# Patient Record
Sex: FEMALE | Race: WHITE | NOT HISPANIC OR LATINO | Employment: UNEMPLOYED | URBAN - METROPOLITAN AREA
[De-identification: names, ages, dates, MRNs, and addresses within clinical notes are randomized per-mention and may not be internally consistent; named-entity substitution may affect disease eponyms.]

---

## 2024-05-14 ENCOUNTER — OFFICE VISIT (OUTPATIENT)
Dept: OBSTETRICS AND GYNECOLOGY | Facility: CLINIC | Age: 54
End: 2024-05-14
Payer: COMMERCIAL

## 2024-05-14 VITALS
BODY MASS INDEX: 23.98 KG/M2 | HEIGHT: 61 IN | DIASTOLIC BLOOD PRESSURE: 64 MMHG | SYSTOLIC BLOOD PRESSURE: 114 MMHG | WEIGHT: 127 LBS

## 2024-05-14 DIAGNOSIS — Z12.31 ENCOUNTER FOR SCREENING MAMMOGRAM FOR BREAST CANCER: ICD-10-CM

## 2024-05-14 DIAGNOSIS — R92.343 EXTREMELY DENSE TISSUE OF BOTH BREASTS ON MAMMOGRAPHY: ICD-10-CM

## 2024-05-14 DIAGNOSIS — Z01.419 ENCOUNTER FOR WELL WOMAN EXAM WITH ROUTINE GYNECOLOGICAL EXAM: Primary | ICD-10-CM

## 2024-05-14 PROCEDURE — 99396 PREV VISIT EST AGE 40-64: CPT | Performed by: OBSTETRICS & GYNECOLOGY

## 2024-05-14 RX ORDER — ACETAMINOPHEN 500 MG
5000 TABLET ORAL
COMMUNITY

## 2024-05-14 NOTE — PROGRESS NOTES
PAP 23 NEG HPV NEG  MAMMO 23  DEXA NEVER  COLON NEVER/ FECAL CARD 21 NEG    ASSESSMENT/PLAN    1. Encounter for well woman exam with routine gynecological exam  Normal well woman exam.   No pap done.   Last pap  was normal and HPV negative.     2. Encounter for screening mammogram for breast cancer  A screening mammogram requisition has been placed.   Please schedule your mammogram     3. Extremely dense tissue of both breasts on mammography  Had referral at [facility] high risk breast clinic  MR breast bilateral w IV contrast fast screening self pay;  - fast breast MRI  schedule for 2024.     SUBJECTIVE    HPI    54 yo  for routine well woman visit.   Last exam 2023 for RA  Last year was referred to [facility] risk breast clinic due to dense breasts on imaging. Recommended to have MRI and planned to have at [facility] 2024. Mammograms done at . Due for screening mamm now. Offered option of doing fast breast MRI at  in November so her mamm results can be compared. Pt agreeable.  Denies any breast complaints.   Stopped thyroid med in past and has bloodwork every 6 months to follow.  Menopausal age 51. Denies any bleeding.  No other intervening medical or surgical issues.  Oldest son graduated from [employer], working in [place]. Other son finishing freshman year at [employer].  , no smoke, occ ETOH.    REVIEW OF SYSTEMS    Constitutional: no fever, no chills, + weight gain, no recent weight loss, no fatigue.   Eyes: no eye pain, no vision problems, no dryness of the eyes.   ENT: no hearing loss, no nosebleeds, no sinus congestion.   Cardiovascular: no chest pain, no palpitations.  Respiratory: no shortness of breath, no cough, no wheezing.   Gastrointestinal: no abdominal pain, no constipation, no nausea, no diarrhea, no vomiting.   Genitourinary: no pelvic pain, no dysuria, + occ incontinence, no change in urinary frequency, no vaginal dryness, no vaginal itching,  no vaginal discharge, no  "unexplained vaginal bleeding, no lesion/sore.   Musculoskeletal: no back pain, no joint swelling and no leg edema.   Integumentary: no rashes, no skin lesions, no breast pain, no nipple discharge, no breast lump.   Neurological: no headache, no numbness, no dizziness.   Psychiatric: no sleep disturbances, no anxiety, no depression.   Endocrine: + hot flashes, no loss of hair, no hirsutism.   Hematologic/Lymphatic: no swollen glands, no tendency for easy bleeding,  no tendency for easy bruising.      OBJECTIVE    /64   Ht 1.549 m (5' 1\")   Wt 57.6 kg (127 lb)   LMP 10/01/2021   BMI 24.00 kg/m²      Physical Exam     Constitutional: Alert and in no acute distress. Well developed, well nourished   Head and Face: Head and face: normal   Eyes: Normal external exam - nonicteric sclera, extraocular movements intact (EOMI) and no ptosis.   Ears, Nose, Mouth, and Throat: External inspection of ears and nose: normal   Neck: no neck asymmetry. Supple and thyroid not enlarged and there were no palpable thyroid nodules   Cardiovascular: Heart rate and rhythm were normal, normal S1 and S2, no gallops, and no murmurs   Pulmonary: No respiratory distress and clear bilateral breath sounds   Chest: Breasts: normal appearance, no nipple discharge and no skin changes and palpation of breasts and axillae: no palpable mass and no axillary lymphadenopathy   Abdomen: soft nontender; no abdominal mass palpated, no organomegaly and no hernias   Genitourinary: external genitalia: normal, no inguinal lymphadenopathy, Bartholin's urethral and [place]'s glands: normal, urethra: normal, bladder: normal on palpation and perianal area: normal   Vagina: normal.   Cervix: Normal appearing without lesions.  Uterus: Normal, mobile, nontender.  Right Adnexa/parametria: Normal.   Left Adnexa/parametria: Normal.   Skin: normal skin color and pigmentation, normal skin turgor, and no rash.   Psychiatric: alert and oriented x 3., affect normal to " patient baseline and mood: appropriate        Miracle Biggs MD

## 2024-05-22 ENCOUNTER — HOSPITAL ENCOUNTER (OUTPATIENT)
Dept: RADIOLOGY | Facility: CLINIC | Age: 54
Discharge: HOME | End: 2024-05-22
Payer: COMMERCIAL

## 2024-05-22 VITALS — BODY MASS INDEX: 23.98 KG/M2 | HEIGHT: 61 IN | WEIGHT: 126.98 LBS

## 2024-05-22 DIAGNOSIS — Z12.31 ENCOUNTER FOR SCREENING MAMMOGRAM FOR BREAST CANCER: ICD-10-CM

## 2024-05-22 PROCEDURE — 77067 SCR MAMMO BI INCL CAD: CPT

## 2024-05-22 PROCEDURE — 77067 SCR MAMMO BI INCL CAD: CPT | Performed by: RADIOLOGY

## 2024-05-22 PROCEDURE — 77063 BREAST TOMOSYNTHESIS BI: CPT | Performed by: RADIOLOGY

## 2024-06-25 ENCOUNTER — APPOINTMENT (OUTPATIENT)
Dept: OBSTETRICS AND GYNECOLOGY | Facility: CLINIC | Age: 54
End: 2024-06-25
Payer: COMMERCIAL